# Patient Record
Sex: FEMALE | Race: WHITE | NOT HISPANIC OR LATINO | URBAN - METROPOLITAN AREA
[De-identification: names, ages, dates, MRNs, and addresses within clinical notes are randomized per-mention and may not be internally consistent; named-entity substitution may affect disease eponyms.]

---

## 2024-06-19 ENCOUNTER — OFFICE VISIT (OUTPATIENT)
Dept: FAMILY MEDICINE CLINIC | Facility: CLINIC | Age: 34
End: 2024-06-19
Payer: COMMERCIAL

## 2024-06-19 VITALS
SYSTOLIC BLOOD PRESSURE: 114 MMHG | BODY MASS INDEX: 25.49 KG/M2 | WEIGHT: 153 LBS | OXYGEN SATURATION: 98 % | HEIGHT: 65 IN | DIASTOLIC BLOOD PRESSURE: 74 MMHG | HEART RATE: 98 BPM

## 2024-06-19 DIAGNOSIS — J30.2 SEASONAL ALLERGIES: ICD-10-CM

## 2024-06-19 DIAGNOSIS — Z00.00 ANNUAL PHYSICAL EXAM: Primary | ICD-10-CM

## 2024-06-19 DIAGNOSIS — Z80.8 FAMILY HISTORY OF MELANOMA: ICD-10-CM

## 2024-06-19 DIAGNOSIS — F90.9 ATTENTION DEFICIT HYPERACTIVITY DISORDER (ADHD), UNSPECIFIED ADHD TYPE: ICD-10-CM

## 2024-06-19 DIAGNOSIS — Z00.00 ROUTINE HEALTH MAINTENANCE: ICD-10-CM

## 2024-06-19 DIAGNOSIS — L70.0 CYSTIC ACNE: ICD-10-CM

## 2024-06-19 DIAGNOSIS — F11.91 OPIOID USE DISORDER IN REMISSION: ICD-10-CM

## 2024-06-19 DIAGNOSIS — F31.9 BIPOLAR AFFECTIVE DISORDER, REMISSION STATUS UNSPECIFIED: ICD-10-CM

## 2024-06-19 DIAGNOSIS — Z23 NEED FOR VACCINATION: ICD-10-CM

## 2024-06-19 DIAGNOSIS — Z13.1 SCREENING FOR DIABETES MELLITUS: ICD-10-CM

## 2024-06-19 DIAGNOSIS — Z13.220 SCREENING FOR LIPID DISORDERS: ICD-10-CM

## 2024-06-19 DIAGNOSIS — Z13.29 SCREENING FOR THYROID DISORDER: ICD-10-CM

## 2024-06-19 PROCEDURE — 90471 IMMUNIZATION ADMIN: CPT | Performed by: INTERNAL MEDICINE

## 2024-06-19 PROCEDURE — 2014F MENTAL STATUS ASSESS: CPT | Performed by: INTERNAL MEDICINE

## 2024-06-19 PROCEDURE — 1159F MED LIST DOCD IN RCRD: CPT | Performed by: INTERNAL MEDICINE

## 2024-06-19 PROCEDURE — 1160F RVW MEDS BY RX/DR IN RCRD: CPT | Performed by: INTERNAL MEDICINE

## 2024-06-19 PROCEDURE — 90715 TDAP VACCINE 7 YRS/> IM: CPT | Performed by: INTERNAL MEDICINE

## 2024-06-19 PROCEDURE — 99395 PREV VISIT EST AGE 18-39: CPT | Performed by: INTERNAL MEDICINE

## 2024-06-19 RX ORDER — ATOMOXETINE 40 MG/1
40 CAPSULE ORAL DAILY
COMMUNITY

## 2024-06-19 RX ORDER — SPIRONOLACTONE 100 MG/1
100 TABLET, FILM COATED ORAL DAILY
Qty: 30 TABLET | Refills: 0 | Status: SHIPPED | OUTPATIENT
Start: 2024-06-19

## 2024-06-19 RX ORDER — SPIRONOLACTONE 100 MG/1
100 TABLET, FILM COATED ORAL DAILY
COMMUNITY
End: 2024-06-19 | Stop reason: SDUPTHER

## 2024-06-19 RX ORDER — DOXYCYCLINE HYCLATE 100 MG/1
100 CAPSULE ORAL 2 TIMES DAILY
Qty: 60 CAPSULE | Refills: 0 | Status: SHIPPED | OUTPATIENT
Start: 2024-06-19 | End: 2024-07-19

## 2024-06-19 RX ORDER — DOXYCYCLINE HYCLATE 100 MG/1
100 CAPSULE ORAL
COMMUNITY
Start: 2024-05-30 | End: 2024-06-19 | Stop reason: SDUPTHER

## 2024-06-19 RX ORDER — FLUTICASONE PROPIONATE 50 MCG
2 SPRAY, SUSPENSION (ML) NASAL DAILY
Qty: 9.9 ML | Refills: 11 | Status: SHIPPED | OUTPATIENT
Start: 2024-06-19

## 2024-06-19 NOTE — ASSESSMENT & PLAN NOTE
Has been in recovery since 2022!  She feels she has adequate local resources to stay in recovery but I told her if that changes, we can help with that (via CM consult, etc)  Previously on MOUD but prefers to hold off on that  States HIV and HCV testing negative at [place], declines re-testing today as no known potential exposures since then

## 2024-06-19 NOTE — ASSESSMENT & PLAN NOTE
Diabetes screening: Today  Lipid screening: Today  Lung cancer screening: Not indicated  Colon cancer screening: Not indicated until 45  Breast cancer screening: Age 40  Cervical cancer screening: DUE, referred to gyn  Contraception: Has IUD, has been in since 2016 and needs new one, referred to gyn  Discussed limiting or avoidance of alcohol  Does not use tobacco  Discussed recommendation for seatbelt and sunscreen use   Discussed recommendation for yearly eye and dental exams  Vaccines: Overdue for tdap, given today  Diet and exercise habits discussed  FH:   Dad, mother, mother all have had moles removed, father with melanoma  Grandmother has had breast cancer 2x, uterine cancer  Dad has some sort of cardiac issues  Brother and father with hx of addiction

## 2024-06-19 NOTE — ASSESSMENT & PLAN NOTE
Persists despite Doxycycline and Spironolactone  Already has appt with dermatologist (pt cannot recall the name), referral ordered

## 2024-06-19 NOTE — PROGRESS NOTES
"Chief Complaint  Establish Care    Subjective        HPI   Felicia presents to [facility] for a new pt visit.  She's originally from [facility]. Went to [facility], stayed in [place] x 12 years, moved back here October 2022 for OUD treatment and recovery. She states she suffered from opioid addiction. Started as Oxycontin then progressed to IV heroin use. Has been in recovery since 2022! Owns her own business now.   Diabetes screening: Today  Lipid screening: Today  Lung cancer screening: Not indicated  Colon cancer screening: Not indicated until 45  Breast cancer screening: Age 40  Cervical cancer screening: DUE, referred to gyn  Contraception: Has IUD, has been in since 2016  Discussed limiting or avoidance of alcohol  Does not use tobacco  Discussed recommendation for seatbelt and sunscreen use   Discussed recommendation for yearly eye and dental exams  Vaccines: Overdue for tdap, given today  Diet and exercise habits discussed  FH:   Dad, mother, mother all have had moles removed, father with melanoma  Grandmother has had breast cancer 2x, uterine cancer  Dad has some sort of cardiac issues  Brother and father with hx of addiction    Separate from physical/wellness visit, we discussed the following:    Needs referrals to gyn, derm, psychiatry  Will be switching to private insurance    She is in recovery from OUD  Went [place] inpt, then OP  Goes to meetings, meditations, prayer, considering service activities  Was on Suboxone, then monthly injection, hasn't been on for a year and prefers not to take in future  On Lamictal for bipolar II, dx at age 17  Buspar for anxiety, taking prn, taking a lot less now, now 10 mg bid  Taking Strattera for ADHD dx last year  Has 3 months of refills  Has a therapist  HIV and HCV screening negative at [place], per pt    With stress, gets cystic facial lesions  Trying to see derm due to continued cystic acne, gets \"cysts\" the size of a quarter at times  On " "spironolactone daily and doxycycline bid  She has derm appt July 11 but cannot recall name of provider  Wonders if hormonal component        Objective   Vital Signs:  Vitals:    06/19/24 1028   BP: 114/74   BP Location: Left arm   Patient Position: Sitting   Cuff Size: Adult   Pulse: 98   SpO2: 98%   Weight: 69.4 kg (153 lb)   Height: 165.1 cm (65\")          Physical Exam  Constitutional:       General: She is not in acute distress.     Appearance: Normal appearance. She is not ill-appearing or toxic-appearing.   HENT:      Head: Normocephalic and atraumatic.      Right Ear: Tympanic membrane and ear canal normal.      Left Ear: Tympanic membrane and ear canal normal.      Mouth/Throat:      Mouth: Mucous membranes are moist.      Pharynx: Oropharynx is clear. No oropharyngeal exudate or posterior oropharyngeal erythema.   Eyes:      General: No scleral icterus.        Right eye: No discharge.         Left eye: No discharge.      Conjunctiva/sclera: Conjunctivae normal.   Cardiovascular:      Rate and Rhythm: Normal rate and regular rhythm.      Heart sounds: Normal heart sounds. No murmur heard.     No friction rub. No gallop.   Pulmonary:      Effort: Pulmonary effort is normal. No respiratory distress.      Breath sounds: Normal breath sounds. No wheezing or rhonchi.   Abdominal:      General: Bowel sounds are normal. There is no distension.      Palpations: Abdomen is soft.      Tenderness: There is no abdominal tenderness. There is no guarding or rebound.   Musculoskeletal:         General: No swelling, tenderness or deformity. Normal range of motion.      Cervical back: Normal range of motion and neck supple. No rigidity.   Lymphadenopathy:      Cervical: No cervical adenopathy.   Skin:     General: Skin is warm and dry.      Coloration: Skin is not jaundiced or pale.      Findings: No lesion or rash.   Neurological:      General: No focal deficit present.      Mental Status: She is alert and oriented to " person, place, and time.   Psychiatric:         Mood and Affect: Mood normal.         Behavior: Behavior normal.         Judgment: Judgment normal.          Result Review :                Assessment and Plan    Diagnoses and all orders for this visit:    1. Annual physical exam (Primary)    2. Routine health maintenance  Assessment & Plan:  Diabetes screening: Today  Lipid screening: Today  Lung cancer screening: Not indicated  Colon cancer screening: Not indicated until 45  Breast cancer screening: Age 40  Cervical cancer screening: DUE, referred to gyn  Contraception: Has IUD, has been in since 2016 and needs new one, referred to gyn  Discussed limiting or avoidance of alcohol  Does not use tobacco  Discussed recommendation for seatbelt and sunscreen use   Discussed recommendation for yearly eye and dental exams  Vaccines: Overdue for tdap, given today  Diet and exercise habits discussed  FH:   Dad, mother, mother all have had moles removed, father with melanoma  Grandmother has had breast cancer 2x, uterine cancer  Dad has some sort of cardiac issues  Brother and father with hx of addiction    Orders:  -     CBC (No Diff)  -     Comprehensive Metabolic Panel  -     Ambulatory Referral to Gynecology    3. Screening for lipid disorders  -     Lipid Panel    4. Screening for thyroid disorder  -     TSH Rfx On Abnormal To Free T4    5. Screening for diabetes mellitus  -     Hemoglobin A1c    6. Family history of melanoma  Assessment & Plan:  Will be establishing care with a dermatologist      7. Bipolar affective disorder, remission status unspecified  Assessment & Plan:  Previously managed by [facility] and psychiatric providers they referred her to  She needs a new psychiatrist, referred  She is on Lamictal and Buspar (for anxiety)    Orders:  -     Ambulatory Referral to Psychiatry    8. Attention deficit hyperactivity disorder (ADHD), unspecified ADHD type  Assessment & Plan:  On Strattera, referred to  psychiatry    Orders:  -     Ambulatory Referral to Psychiatry    9. Opioid use disorder in remission  Assessment & Plan:  Has been in recovery since 2022!  She feels she has adequate local resources to stay in recovery but I told her if that changes, we can help with that (via CM consult, etc)  Previously on MOUD but prefers to hold off on that  States HIV and HCV testing negative at [place], declines re-testing today as no known potential exposures since then    Orders:  -     Ambulatory Referral to Psychiatry    10. Need for vaccination  -     Tdap Vaccine => 6yo IM (BOOSTRIX)    11. Seasonal allergies  Assessment & Plan:  Flonase sent to pharmacy    Orders:  -     fluticasone (FLONASE) 50 MCG/ACT nasal spray; 2 sprays into the nostril(s) as directed by provider Daily.  Dispense: 9.9 mL; Refill: 11    12. Cystic acne  Assessment & Plan:  Persists despite Doxycycline and Spironolactone  Already has appt with dermatologist (pt cannot recall the name), referral ordered    Orders:  -     doxycycline (VIBRAMYCIN) 100 MG capsule; Take 1 capsule by mouth 2 (Two) Times a Day for 30 days.  Dispense: 60 capsule; Refill: 0  -     spironolactone (ALDACTONE) 100 MG tablet; Take 1 tablet by mouth Daily.  Dispense: 30 tablet; Refill: 0  -     Ambulatory Referral to Dermatology        Follow Up   Return in about 1 year (around 6/19/2025) for Annual physical.  Patient was given instructions and counseling regarding her condition or for health maintenance advice. Please see specific information pulled into the AVS if appropriate.

## 2024-06-19 NOTE — ASSESSMENT & PLAN NOTE
Previously managed by [facility] and psychiatric providers they referred her to  She needs a new psychiatrist, referred  She is on Lamictal and Buspar (for anxiety)

## 2024-06-20 LAB
ALBUMIN SERPL-MCNC: 4.7 G/DL (ref 3.9–4.9)
ALP SERPL-CCNC: 46 IU/L (ref 44–121)
ALT SERPL-CCNC: 13 IU/L (ref 0–32)
AST SERPL-CCNC: 18 IU/L (ref 0–40)
BILIRUB SERPL-MCNC: 0.3 MG/DL (ref 0–1.2)
BUN SERPL-MCNC: 11 MG/DL (ref 6–20)
BUN/CREAT SERPL: 13 (ref 9–23)
CALCIUM SERPL-MCNC: 9.9 MG/DL (ref 8.7–10.2)
CHLORIDE SERPL-SCNC: 100 MMOL/L (ref 96–106)
CHOLEST SERPL-MCNC: 169 MG/DL (ref 100–199)
CO2 SERPL-SCNC: 25 MMOL/L (ref 20–29)
CREAT SERPL-MCNC: 0.83 MG/DL (ref 0.57–1)
EGFRCR SERPLBLD CKD-EPI 2021: 97 ML/MIN/1.73
ERYTHROCYTE [DISTWIDTH] IN BLOOD BY AUTOMATED COUNT: 11.9 % (ref 11.7–15.4)
GLOBULIN SER CALC-MCNC: 2.7 G/DL (ref 1.5–4.5)
GLUCOSE SERPL-MCNC: 99 MG/DL (ref 70–99)
HBA1C MFR BLD: 5.5 % (ref 4.8–5.6)
HCT VFR BLD AUTO: 39.5 % (ref 34–46.6)
HDLC SERPL-MCNC: 54 MG/DL
HGB BLD-MCNC: 13.7 G/DL (ref 11.1–15.9)
LDLC SERPL CALC-MCNC: 99 MG/DL (ref 0–99)
MCH RBC QN AUTO: 29.7 PG (ref 26.6–33)
MCHC RBC AUTO-ENTMCNC: 34.7 G/DL (ref 31.5–35.7)
MCV RBC AUTO: 86 FL (ref 79–97)
PLATELET # BLD AUTO: 233 X10E3/UL (ref 150–450)
POTASSIUM SERPL-SCNC: 4.9 MMOL/L (ref 3.5–5.2)
PROT SERPL-MCNC: 7.4 G/DL (ref 6–8.5)
RBC # BLD AUTO: 4.61 X10E6/UL (ref 3.77–5.28)
SODIUM SERPL-SCNC: 137 MMOL/L (ref 134–144)
TRIGL SERPL-MCNC: 84 MG/DL (ref 0–149)
TSH SERPL DL<=0.005 MIU/L-ACNC: 1.46 UIU/ML (ref 0.45–4.5)
VLDLC SERPL CALC-MCNC: 16 MG/DL (ref 5–40)
WBC # BLD AUTO: 8.1 X10E3/UL (ref 3.4–10.8)

## 2024-07-11 ENCOUNTER — TELEPHONE (OUTPATIENT)
Dept: FAMILY MEDICINE CLINIC | Facility: CLINIC | Age: 34
End: 2024-07-11

## 2024-07-11 NOTE — TELEPHONE ENCOUNTER
Caller: Felicia Chávez    Relationship: Self    Best call back number: [phone]     What is the medical concern/diagnosis: SHARP OBJECT IN HEEL    What specialty or service is being requested: PODIATRIST     What is the provider, practice or medical service name: DR. MARCH    What is the office phone number: [phone]

## 2024-07-12 NOTE — TELEPHONE ENCOUNTER
Hub staff attempted to follow warm transfer process and was unsuccessful     Caller: Felicia Chávez    Relationship to patient: Self    Best call back number: [phone]     Patient is needing: RETURNED CALL TO [facility].  NOTE PATIENT CANNOT CURRENTLY WALK.

## 2024-07-12 NOTE — TELEPHONE ENCOUNTER
Patient has sharp pain in heel and is wanting referral to Podiatrist. Patient went to her Dermatologist and they are saying you will need to put referral in for patient.

## 2024-07-13 DIAGNOSIS — M79.671 PAIN OF RIGHT HEEL: Primary | ICD-10-CM

## 2024-07-15 ENCOUNTER — TELEPHONE (OUTPATIENT)
Dept: FAMILY MEDICINE CLINIC | Facility: CLINIC | Age: 34
End: 2024-07-15

## 2024-07-15 NOTE — TELEPHONE ENCOUNTER
Caller: Felicia Chávez    Relationship: Self    Best call back number: [phone]         Who are you requesting to speak with (clinical staff, provider,  specific staff member):       What was the call regarding: PATIENT WAS CALLING TO GIVE THE FAX NUMBER FOR PODIATRY AND THEY STATED ON THURSDAY AND FRIDAY THAT THEY NEEDED US TO SEND REFERRAL.    FAX NUMBER [phone]    PLEASE CALL PATIENT TO UPDATE

## 2024-07-22 DIAGNOSIS — L70.0 CYSTIC ACNE: ICD-10-CM

## 2024-07-22 RX ORDER — SPIRONOLACTONE 100 MG/1
100 TABLET, FILM COATED ORAL DAILY
Qty: 30 TABLET | Refills: 0 | Status: SHIPPED | OUTPATIENT
Start: 2024-07-22

## 2024-08-21 NOTE — TELEPHONE ENCOUNTER
Caller: Felicia Chávez    Relationship: Self    Best call back number: [phone]      Requested Prescriptions:   Requested Prescriptions     Pending Prescriptions Disp Refills    atomoxetine (STRATTERA) 40 MG capsule       Sig: Take 1 capsule by mouth Daily.        Pharmacy where request should be sent: [facility] [id number] [facility] [address] AT [facility] [address] - [phone]  - [phone] FX     Last office visit with prescribing clinician: 6/19/2024   Last telemedicine visit with prescribing clinician: Visit date not found   Next office visit with prescribing clinician: Visit date not found     Additional details provided by patient: IT CAME IN TWO SEPARATE CONTAINERS AND SHE ACCIDENTALLY THREW ONE AWAY.  CAN SHE GET A REFILL ON IT PLEASE.     Does the patient have less than a 3 day supply:  [x] Yes  [] No    Would you like a call back once the refill request has been completed: [] Yes [x] No    If the office needs to give you a call back, can they leave a voicemail: [] Yes [x] No    Vicenta Sung Rep   08/21/24 15:33 EDT

## 2024-08-23 NOTE — TELEPHONE ENCOUNTER
Caller: Felicia Chávez    Relationship to patient: Self    Best call back number: [phone]    Patient is needing: PATIENT IS CALLING FOR AN UPDATE TO SEE IF THIS CAN BE EXPEDITED AS SHE HAS BEEN WITHOUT HER MEDICATION FOR 2 DAYS NOW. PATIENT ACCIDENTALLY THREW AWAY ON OF THE CONTAINER OF MEDICINE AND IS NEEDING A REPLACEMENT SENT IN.    PREF[id number] [address] AT [facility] [address] - [phone]  - [phone]  [phone]

## 2024-08-23 NOTE — TELEPHONE ENCOUNTER
Caller: Felicia Chávez    Relationship: Self    Best call back number: [phone]     What was the call regarding: PATIENT IS CALLING BACK IN TO CHECK ON THE STATUS OF HER MEDICATION REQUEST. PLEASE ADVISE.

## 2024-08-24 RX ORDER — ATOMOXETINE 40 MG/1
40 CAPSULE ORAL DAILY
OUTPATIENT
Start: 2024-08-24

## 2024-08-26 ENCOUNTER — TELEPHONE (OUTPATIENT)
Dept: FAMILY MEDICINE CLINIC | Facility: CLINIC | Age: 34
End: 2024-08-26
Payer: COMMERCIAL

## 2024-08-26 DIAGNOSIS — F90.9 ATTENTION DEFICIT HYPERACTIVITY DISORDER (ADHD), UNSPECIFIED ADHD TYPE: Primary | ICD-10-CM

## 2024-08-26 NOTE — TELEPHONE ENCOUNTER
Caller: Felicia Chávez    Relationship: Self    Best call back number: [phone]     What medication are you requesting: atomoxetine (STRATTERA) 40 MG capsule     If a prescription is needed, what is your preferred pharmacy and phone number: [facility] #[id number] - [place], [place] - [facility] [address] [facility] [phone] [facility] [phone] FX     Additional notes: PATIENT STATES THAT SHE IS NO LONGER SEEING THE PSYCHIATRIST, AND HAD PREVIOUSLY DISCUSSED MEDICATION WITH DR SAUNDERS. REQUESTS TO HAVE PCP TAKE OVER PRESCRIPTION

## 2024-08-27 RX ORDER — ATOMOXETINE 40 MG/1
40 CAPSULE ORAL DAILY
Qty: 30 CAPSULE | Refills: 1 | Status: SHIPPED | OUTPATIENT
Start: 2024-08-27

## 2024-09-10 ENCOUNTER — OFFICE VISIT (OUTPATIENT)
Dept: OBSTETRICS AND GYNECOLOGY | Facility: CLINIC | Age: 34
End: 2024-09-10
Payer: COMMERCIAL

## 2024-09-10 VITALS
HEART RATE: 108 BPM | SYSTOLIC BLOOD PRESSURE: 113 MMHG | BODY MASS INDEX: 26.49 KG/M2 | WEIGHT: 159.2 LBS | DIASTOLIC BLOOD PRESSURE: 77 MMHG

## 2024-09-10 DIAGNOSIS — Z11.3 SCREEN FOR SEXUALLY TRANSMITTED DISEASES: ICD-10-CM

## 2024-09-10 DIAGNOSIS — Z01.411 ENCOUNTER FOR GYNECOLOGICAL EXAMINATION WITH ABNORMAL FINDING: Primary | ICD-10-CM

## 2024-09-10 DIAGNOSIS — Z12.4 SCREENING FOR MALIGNANT NEOPLASM OF CERVIX: ICD-10-CM

## 2024-09-10 PROCEDURE — 99385 PREV VISIT NEW AGE 18-39: CPT | Performed by: OBSTETRICS & GYNECOLOGY

## 2024-09-10 PROCEDURE — 99459 PELVIC EXAMINATION: CPT | Performed by: OBSTETRICS & GYNECOLOGY

## 2024-09-10 RX ORDER — LAMOTRIGINE 100 MG/1
1 TABLET ORAL DAILY
COMMUNITY
Start: 2024-08-12

## 2024-09-10 RX ORDER — BUSPIRONE HYDROCHLORIDE 15 MG/1
15 TABLET ORAL 3 TIMES DAILY PRN
COMMUNITY
Start: 2024-08-12

## 2024-09-10 NOTE — PROGRESS NOTES
Chief Complaint  Annual Exam (Pt here for an annual exam. Not sure when last annual or pap was, reports it being a long time. Was in recovery for substance abuse and wasn't seeing any doctors during that time. Has had an IUD for 7.5 years, would like to consult regarding removal and contraceptive options as a replacement.)    Palmira Chávez presents to [facility]  History of Present Illness  Patient is here for annual exam.  She is also due to have her Mirena IUD removed.  She says that has been in for about the last 8 years.   She does not recall the time of her last Pap smear.  She is without major gynecologic complaints at this time.  She think she would be interested in having another Mirena inserted.    Menstrual History:  OB History          0    Para   0    Term   0       0    AB   0    Living   0         SAB   0    IAB   0    Ectopic   0    Molar   0    Multiple   0    Live Births   0                 No LMP recorded. Patient has had an implant.     Past Medical History:   Diagnosis Date    ADHD     Depression      History reviewed. No pertinent surgical history.    Family History   Problem Relation Age of Onset    Melanoma Father     No Known Problems Mother     Breast cancer Maternal Grandmother     Uterine cancer Maternal Grandmother      Social History     Tobacco Use    Smoking status: Never     Passive exposure: Never    Smokeless tobacco: Never   Vaping Use    Vaping status: Never Used   Substance Use Topics    Alcohol use: Yes     Comment: occasional    Drug use: Not Currently     Types: Other, Marijuana     Comment: has been sober for almost 2 years     Current Outpatient Medications on File Prior to Visit   Medication Sig    atomoxetine (STRATTERA) 40 MG capsule Take 1 capsule by mouth Daily.    busPIRone (BUSPAR) 15 MG tablet Take 1 tablet by mouth 3 (Three) Times a Day As Needed.    fluticasone (FLONASE) 50 MCG/ACT nasal spray 2 sprays into  "the nostril(s) as directed by provider Daily.    lamoTRIgine (LaMICtal) 100 MG tablet Take 1 tablet by mouth Daily.    Levonorgestrel (MIRENA) 20 MCG/DAY intrauterine device IUD by Intrauterine route.    Levonorgestrel (MIRENA) 20 MCG/DAY intrauterine device IUD To be inserted one time by prescriber. Route intrauterine.    [DISCONTINUED] busPIRone (BUSPAR) 10 MG tablet Take 1 tablet by mouth 3 (Three) Times a Day As Needed.    [DISCONTINUED] lamoTRIgine (LaMICtal) 25 MG tablet Take 4 tablets by mouth Daily.    [DISCONTINUED] spironolactone (ALDACTONE) 100 MG tablet TAKE 1 TABLET BY MOUTH EVERY DAY     No current facility-administered medications on file prior to visit.     No Known Allergies    Review of systems:  Constitutional: No fevers, chills, sweats   Eye: No recent visual problems, denies blurry vision   HEENT: No ear pain, nasal congestion, sore throat, voice changes   Respiratory: No shortness of breath, cough, pain on breathing   Cardiovascular: No Chest pain, palpitations   Gastrointestinal: No nausea, vomiting, diarrhea, constipation   Genitourinary: No hematuria, dysuria, lesions on genitalia   Hema/Lymph: Negative for bruising, no edema   Endocrine: Negative for excessive thirst, excessive hunger, heat or cold intolerance   Musculoskeletal: No joint pain, muscle pain, decreased range of motion   Integumentary: No rash, pruritus, abrasions, lesions   Neurologic: No weakness, numbness, headaches   Psychiatric: No anxiety, depression, mood changes       Objective   Vital Signs:  /77   Pulse 108   Wt 72.2 kg (159 lb 3.2 oz)   BMI 26.49 kg/m²   Estimated body mass index is 26.49 kg/m² as calculated from the following:    Height as of 6/19/24: 165.1 cm (65\").    Weight as of this encounter: 72.2 kg (159 lb 3.2 oz).          Physical Exam   Exam performed in the presence of a female chaperone  Patient has provided verbal consent to proceed with exam.    Gen: No acute distress, awake and oriented " times three  HENT: Normocephalic, atraumatic, Moist mucous membranes  Eyes: PERRLA, EOMI  Lungs: Normal work of breathing, lungs clear bilaterally  Breast: Symmetrical. No skin changes or nipple retractions. No lumps or masses bilaterally. No tenderness bilaterally.  Abdomen: soft, nontender, no masses or hernia, non distended, normoactive bowel sounds  Normal external female genitalia, no lesions  Urethra: Normal meatus, no caruncle  Bladder: nontender  Vagina: No blood or discharge  Cervix: No cervical motion tenderness, no lesions, no active bleeding, nonfriable  Uterus: Anteverted, normal size and shape, nontender, IUD string seen at os  Adnexa: No masses or tenderness  External anal exam: Normal appearance, no lesions or hemorrhoids  Rectal: Deferred  Skin: Warm and dry, no rashes  Psych: Good judgement and insight, normal affect and mood  Neuro: CN 2-12 intact, no gross deficits    Result Review :                Assessment and Plan   Diagnoses and all orders for this visit:    1. Encounter for gynecological examination with abnormal finding (Primary)    2. Screening for malignant neoplasm of cervix  -     IGP,CtNgTv,Apt HPV,rfx 16 / 18,45    3. Screen for sexually transmitted diseases  -     IGP,CtNgTv,Apt HPV,rfx 16 / 18,45    Pap - Ordered today.  STD screening - Cultures performed today. Labs offered to pt and patient declines.  She states she had recent STD blood work done at another office.  Contraception - Options discussed with pt at length. Risks, benefits, side effects, and alternatives to various forms of hormonal, nonhormonal and barrier methods discussed. Pt elects to continue with Mirena IUD.  She will be due for removal and reinsertion very soon.  She can schedule this appointment.   Safe sex practices encouraged with patient.  Breast cancer screening. Patient encouraged to perform routine self breast exams. Recommend yearly clinical breast exam and mammogram starting at age 40.  Recommend pt  take calcium and vitamin D supplementation as well as prenatal vitamin or folic acid if she is attempting to conceive.  Encourage aerobic exercise with at least 30 minutes 5 days/wk.  Avoid excessive alcohol use.  Patient is advised to call the office for results after 1 week if she has not seen or heard the results of any tests ordered or performed today.    Various contraceptive options discussed including combined oral contraceptives, contraceptive patch, NuvaRing, progesterone only contraceptive, Depo-Provera, Nexplanon, progesterone IUD, copper IUD, and barrier methods. Patient elects progesterone intrauterine device. Risks, benefits, alternatives discussed including risks of bleeding, infection, subfertility, infertility, scarring, pelvic pain, irregular bleeding, amenorrhea, dyspareunia, migration/perforation/expulsion of device, possible need for surgery for repair, unwanted/ectopic pregnancy, weight gain, and need for surgical removal of device. All questions answered. Patient desires to proceed.  She can schedule at her convenience for ultrasound-guided IUD removal and reinsertion.             Follow Up   Return Next avail US guided Mirena IUD removal and reinsert.  Patient was given instructions and counseling regarding her condition or for health maintenance advice. Please see specific information pulled into the AVS if appropriate.

## 2024-09-16 LAB

## 2024-09-20 ENCOUNTER — TELEPHONE (OUTPATIENT)
Dept: OBSTETRICS AND GYNECOLOGY | Facility: CLINIC | Age: 34
End: 2024-09-20
Payer: COMMERCIAL

## 2024-09-24 ENCOUNTER — OFFICE VISIT (OUTPATIENT)
Dept: OBSTETRICS AND GYNECOLOGY | Facility: CLINIC | Age: 34
End: 2024-09-24
Payer: COMMERCIAL

## 2024-09-24 VITALS
WEIGHT: 158.4 LBS | SYSTOLIC BLOOD PRESSURE: 113 MMHG | DIASTOLIC BLOOD PRESSURE: 79 MMHG | HEART RATE: 97 BPM | HEIGHT: 65 IN | BODY MASS INDEX: 26.39 KG/M2

## 2024-09-24 DIAGNOSIS — Z30.433 ENCOUNTER FOR IUD REMOVAL AND REINSERTION: Primary | ICD-10-CM

## 2024-09-24 PROCEDURE — 1159F MED LIST DOCD IN RCRD: CPT | Performed by: OBSTETRICS & GYNECOLOGY

## 2024-09-24 PROCEDURE — 58300 INSERT INTRAUTERINE DEVICE: CPT | Performed by: OBSTETRICS & GYNECOLOGY

## 2024-09-24 PROCEDURE — 1160F RVW MEDS BY RX/DR IN RCRD: CPT | Performed by: OBSTETRICS & GYNECOLOGY

## 2024-09-24 PROCEDURE — 58301 REMOVE INTRAUTERINE DEVICE: CPT | Performed by: OBSTETRICS & GYNECOLOGY

## 2024-09-24 PROCEDURE — 76998 US GUIDE INTRAOP: CPT | Performed by: OBSTETRICS & GYNECOLOGY

## 2024-10-30 ENCOUNTER — OFFICE VISIT (OUTPATIENT)
Dept: OBSTETRICS AND GYNECOLOGY | Facility: CLINIC | Age: 34
End: 2024-10-30
Payer: COMMERCIAL

## 2024-10-30 VITALS
BODY MASS INDEX: 24.99 KG/M2 | WEIGHT: 150 LBS | HEIGHT: 65 IN | SYSTOLIC BLOOD PRESSURE: 110 MMHG | DIASTOLIC BLOOD PRESSURE: 70 MMHG

## 2024-10-30 DIAGNOSIS — Z11.3 SCREEN FOR SEXUALLY TRANSMITTED DISEASES: ICD-10-CM

## 2024-10-30 DIAGNOSIS — Z30.431 IUD CHECK UP: Primary | ICD-10-CM

## 2024-10-30 NOTE — PROGRESS NOTES
"Chief Complaint  Follow-up (Pt is here for IUD string check. Also would like to be tested for STIs.)    Subjective        Felicia Chávze presents to [facility]  History of Present Illness  Patient is here for routine checkup after IUD insertion.  She is without major complaints at this time.  She reports that sometimes during intercourse in certain positions she notices just a slight discomfort, but otherwise this is not a major problem for her.  She is not otherwise having pain.  Denies irregular bleeding.  She does request STD testing today because she has had a new partner recently.    The following portions of the patient's history were reviewed and updated as appropriate: allergies, current medications, past family history, past medical history, past social history, past surgical history, and problem list.    Objective   Vital Signs:  /70   Ht 165.1 cm (65\")   Wt 68 kg (150 lb)   BMI 24.96 kg/m²   Estimated body mass index is 24.96 kg/m² as calculated from the following:    Height as of this encounter: 165.1 cm (65\").    Weight as of this encounter: 68 kg (150 lb).    BMI is within normal parameters. No other follow-up for BMI required.      Physical Exam   Gen: No acute distress, awake and oriented times three  Abdomen: soft, nontender, no masses or hernia, non distended, normoactive bowel sounds  Pelvic: Exam performed in the presence of a female chaperone  Patient has provided verbal consent to proceed with exam.  Normal external female genitalia, no lesions  Urethra: Normal meatus, no caruncle  Bladder: nontender  Vagina: No blood or discharge  Cervix: No cervical motion tenderness, no lesions, no active bleeding, nonfriable, IUD strings noted at os, about 1-1/2 cm in length.  Would not recommend trimming strings today as it is unlikely this is causing discomfort and trimming the strings further may increase the chances of strings become difficult to find in the " future.  Uterus: Anteverted, normal size and shape, nontender  Adnexa: No masses or tenderness  External anal exam: Normal appearance, no lesions or hemorrhoids  Rectal: Deferred  Psych: Good judgement and insight, normal affect and mood    Result Review :                Assessment and Plan   Diagnoses and all orders for this visit:    1. IUD check up (Primary)    Doing well with IUD.  Routine follow-up recommended.    2. Screen for sexually transmitted diseases  -     Chlamydia trachomatis, Neisseria gonorrhoeae, Trichomonas vaginalis, PCR - Swab, Vagina    STD testing performed at patient request today.  Offered the patient STD blood work as well which she declined.         Follow Up   Return in about 11 months (around 9/30/2025), or Annual.  Patient was given instructions and counseling regarding her condition or for health maintenance advice. Please see specific information pulled into the AVS if appropriate.

## 2024-11-01 LAB
C TRACH RRNA SPEC QL NAA+PROBE: NEGATIVE
N GONORRHOEA RRNA SPEC QL NAA+PROBE: NEGATIVE
T VAGINALIS RRNA SPEC QL NAA+PROBE: NEGATIVE

## 2024-11-04 ENCOUNTER — TELEPHONE (OUTPATIENT)
Dept: OBSTETRICS AND GYNECOLOGY | Facility: CLINIC | Age: 34
End: 2024-11-04
Payer: COMMERCIAL

## 2024-11-04 NOTE — TELEPHONE ENCOUNTER
----- Message from Corrina NEWTON sent at 11/1/2024  3:06 PM EDT -----  Lvm 11/1/2024. LB  ----- Message -----  From: Clay Rangel MD  Sent: 11/1/2024   9:14 AM EDT  To: Corrina Kiser MA    Please let the patient know that her test for gonorrhea, chlamydia, trichomonas were negative

## 2025-07-03 ENCOUNTER — OFFICE VISIT (OUTPATIENT)
Dept: FAMILY MEDICINE CLINIC | Facility: CLINIC | Age: 35
End: 2025-07-03
Payer: COMMERCIAL

## 2025-07-03 VITALS
OXYGEN SATURATION: 99 % | TEMPERATURE: 98.2 F | HEART RATE: 85 BPM | DIASTOLIC BLOOD PRESSURE: 65 MMHG | WEIGHT: 147.8 LBS | BODY MASS INDEX: 24.62 KG/M2 | RESPIRATION RATE: 16 BRPM | HEIGHT: 65 IN | SYSTOLIC BLOOD PRESSURE: 110 MMHG

## 2025-07-03 DIAGNOSIS — F41.9 ANXIETY: Primary | ICD-10-CM

## 2025-07-03 DIAGNOSIS — J30.2 SEASONAL ALLERGIES: ICD-10-CM

## 2025-07-03 DIAGNOSIS — Z11.59 NEED FOR HEPATITIS C SCREENING TEST: ICD-10-CM

## 2025-07-03 DIAGNOSIS — D22.9 BENIGN MOLE: ICD-10-CM

## 2025-07-03 DIAGNOSIS — B07.0 PLANTAR WARTS: ICD-10-CM

## 2025-07-03 PROBLEM — G47.00 INSOMNIA: Status: ACTIVE | Noted: 2022-12-14

## 2025-07-03 RX ORDER — ATOMOXETINE 60 MG/1
60 CAPSULE ORAL EVERY MORNING
COMMUNITY
Start: 2025-05-13

## 2025-07-03 RX ORDER — DOXYCYCLINE 100 MG/1
1 CAPSULE ORAL EVERY 12 HOURS SCHEDULED
COMMUNITY
Start: 2025-03-31 | End: 2025-07-03

## 2025-07-03 RX ORDER — LAMOTRIGINE 150 MG/1
1 TABLET ORAL DAILY
COMMUNITY
Start: 2025-06-11

## 2025-07-03 RX ORDER — HYDROXYZINE PAMOATE 25 MG/1
CAPSULE ORAL
COMMUNITY
Start: 2025-05-26 | End: 2025-07-03

## 2025-07-03 NOTE — PROGRESS NOTES
"Palmira Chávez is a 34 y.o.. female.     Patient here today to become established.    Patient here today with problem list that includes seasonal allergies, cystic acne, Adhd, depression, and bipolar.    Patient stating she sees Psychiatrist Dr. Joy Sharpe at [facility], an on line office. Patient sees therapist Katia.     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    Patient c/o spring and summer seasonal allergies.    Patient with history of mole to right side of face near eye, has had since child. Patient stating mole has gotten larger. Patient denies no issues with bleeding or pain. Patient stating she saw a dermatologist last year that would not remove mole but told her she needed to be seen by plastic surgeon. Patient requesting plastic surgery referral.     Patient sees Dr. Guillermo GYN; last seen 9/10/24 for pap and new IUD placement; denies menses due to IUD.        The following portions of the patient's history were reviewed and updated as appropriate: allergies, current medications, past family history, past medical history, past social history, past surgical history and problem list.    Past Medical History:   Diagnosis Date    ADHD     Depression        History reviewed. No pertinent surgical history.    Review of Systems   Constitutional: Negative.    Respiratory: Negative.     Cardiovascular: Negative.    Musculoskeletal: Negative.    Skin:         Mole to left side of face near eye  2 plantar warts to left foot   Psychiatric/Behavioral: Negative.         No Known Allergies      Objective     Vitals:    07/03/25 0921   BP: 110/65   BP Location: Left arm   Patient Position: Sitting   Cuff Size: Adult   Pulse: 85   Resp: 16   Temp: 98.2 °F (36.8 °C)   TempSrc: Oral   SpO2: 99%   Weight: 67 kg (147 lb 12.8 oz)   Height: 165.1 cm (65\")     Body mass index is 24.6 kg/m².    Physical Exam  Vitals reviewed. Exam conducted with a " chaperone present.   HENT:      Head: Normocephalic.   Eyes:      Pupils: Pupils are equal, round, and reactive to light.   Cardiovascular:      Rate and Rhythm: Normal rate and regular rhythm.      Pulses: Normal pulses.   Pulmonary:      Effort: Pulmonary effort is normal. No respiratory distress.      Breath sounds: Normal breath sounds. No stridor. No wheezing, rhonchi or rales.   Musculoskeletal:         General: Normal range of motion.   Feet:      Comments: 2 small plantar warts noted to left plantar surfaces  Skin:     General: Skin is warm and dry.      Comments: Left side of face close to eye with moderate size mole   Neurological:      Mental Status: She is alert and oriented to person, place, and time.   Psychiatric:         Behavior: Behavior normal.           Current Outpatient Medications:     atomoxetine (STRATTERA) 40 MG capsule, Take 1 capsule by mouth Daily., Disp: 30 capsule, Rfl: 1    atomoxetine (STRATTERA) 60 MG capsule, Take 1 capsule by mouth Every Morning., Disp: , Rfl:     busPIRone (BUSPAR) 15 MG tablet, Take 1 tablet by mouth 3 (Three) Times a Day As Needed., Disp: , Rfl:     fluticasone (FLONASE) 50 MCG/ACT nasal spray, 2 sprays into the nostril(s) as directed by provider Daily., Disp: 9.9 mL, Rfl: 11    lamoTRIgine (LaMICtal) 150 MG tablet, Take 1 tablet by mouth Daily., Disp: , Rfl:     Levonorgestrel (MIRENA) 20 MCG/DAY intrauterine device IUD, by Intrauterine route., Disp: , Rfl:     No results found for this or any previous visit (from the past 12 weeks).      Diagnoses and all orders for this visit:    1. Anxiety (Primary)  -     Lipid Panel With LDL / HDL Ratio; Future  -     CBC & Differential; Future  -     Comprehensive Metabolic Panel; Future  -     TSH; Future  -     T4, Free; Future  -     Urinalysis With Microscopic - Urine, Clean Catch; Future    2. Benign mole  Comments:  left side of face, near eye, has gotten larger over the years, no bleeding issues, no pain  issues  Orders:  -     Ambulatory Referral to Plastic Surgery    3. Plantar warts    4. Seasonal allergies  -     CBC & Differential; Future  -     Comprehensive Metabolic Panel; Future    5. Need for hepatitis C screening test  -     Hepatitis C antibody; Future      Patient Instructions   Anxiety: ordering labs today for further eval. Continue seeing psychiatrist Dr. Joy Sharpe at [facility] and therapist Katia.     Benign mole: referring to plastic surgery for removal.    Plantar warts: small, Patient informed she can use otc products for wart treatment, if resistant to treatment Patient to inform provider for referral to podiatrist.    Seasonal allergies: continue flonase and otc products as needed.       Return for fasting labs, Annual physical.

## 2025-07-03 NOTE — PATIENT INSTRUCTIONS
Anxiety: ordering labs today for further eval. Continue seeing psychiatrist Dr. Joy Sharpe at [facility] and therapist Katia.     Benign mole: referring to plastic surgery for removal.    Plantar warts: small, Patient informed she can use otc products for wart treatment, if resistant to treatment Patient to inform provider for referral to podiatrist.    Seasonal allergies: continue flonase and otc products as needed.

## 2025-07-14 DIAGNOSIS — D22.9 BENIGN MOLE: Primary | ICD-10-CM
